# Patient Record
Sex: FEMALE | Race: WHITE | NOT HISPANIC OR LATINO
[De-identification: names, ages, dates, MRNs, and addresses within clinical notes are randomized per-mention and may not be internally consistent; named-entity substitution may affect disease eponyms.]

---

## 2019-10-22 ENCOUNTER — APPOINTMENT (OUTPATIENT)
Dept: ORTHOPEDIC SURGERY | Facility: CLINIC | Age: 79
End: 2019-10-22
Payer: MEDICARE

## 2019-10-22 DIAGNOSIS — S93.409A SPRAIN OF UNSPECIFIED LIGAMENT OF UNSPECIFIED ANKLE, INITIAL ENCOUNTER: ICD-10-CM

## 2019-10-22 PROCEDURE — 73610 X-RAY EXAM OF ANKLE: CPT | Mod: RT

## 2019-10-22 PROCEDURE — 99203 OFFICE O/P NEW LOW 30 MIN: CPT

## 2019-10-22 NOTE — PHYSICAL EXAM
[de-identified] : Right ankle:\par \par Constitutional: \par The patient is healthy-appearing and in no apparent distress. \par \par Gait and Station: \par The patient ambulates with a normal gait and no limp. \par \par Cardiovascular System: \par Ther capillary refill is less than 2 seconds. \par \par Skin: \par There are no skin abnormalities of ankle.\par \par Ankles and Feet: \par Inspection: \par There is no erythema, induration, warmth, or deformity.  \par There is swelling (anterolateral). \par \par Bony Palpation: \par There is no tenderness of the calcaneal tuberosity, the metatarsals, the tarsometatarsal joints, the navicular tuberosity, the dome of talus, the head of talus, or the inferior tibiofibular joint.\par \par Soft Tissue Palpation: \par There is no tenderness of the tibialis posterior, the tibialis anterior, the plantar fascia, the Achilles tendon, the extensor hallucis longus, or the sinus tarsi. \par There is no tenderness of the peroneus longus and brevis.\par There is no tenderness of the deltoid ligament.   \par There is tenderness of the anterior talofibular ligament and the calcaneofibular ligament. \par \par Active Range of Motion: \par The range of motion at the ankle is full. \par \par Stability: \par The anterior drawer is negative. \par \par Strength: \par There is 5/5 ankle plantarflexion and dorsiflexion.\par \par Neurological System: \par There is normal sensation to light touch at the ankle and foot. \par \par Psychiatric: \par The patient demonstrates a normal mood and affect and is active and alert.\par \par  [de-identified] : X-ray right ankle. There is no significant bony abnormality arthritis or fracture\par

## 2019-10-22 NOTE — ASSESSMENT
[FreeTextEntry1] : Discussed at length with patient exam and imaging. Patient like some exercises only this time. Activity injections discussed. If no improvement in 8-10 weeks patient to follow up in office.

## 2019-10-22 NOTE — HISTORY OF PRESENT ILLNESS
[de-identified] : Patient reports that the RIGHT ankle began to bother her 4 days ago after a fall. Dull aching pain. Pain increases with weightbearing and walking. Mild swelling. Some sensations of instability. Pain with twisting/turning the ankle and flexing the foot.

## 2020-02-12 ENCOUNTER — APPOINTMENT (OUTPATIENT)
Dept: ORTHOPEDIC SURGERY | Facility: CLINIC | Age: 80
End: 2020-02-12
Payer: MEDICARE

## 2020-02-12 PROCEDURE — 72100 X-RAY EXAM L-S SPINE 2/3 VWS: CPT

## 2020-02-12 PROCEDURE — 99213 OFFICE O/P EST LOW 20 MIN: CPT

## 2020-02-12 PROCEDURE — 73502 X-RAY EXAM HIP UNI 2-3 VIEWS: CPT | Mod: RT

## 2020-02-13 NOTE — ASSESSMENT
[FreeTextEntry1] : If no improvement with physical therapy and home exercises consideration to MRI of lumbar spine

## 2020-02-13 NOTE — HISTORY OF PRESENT ILLNESS
[de-identified] : Patient reports the RIGHT posterior hip / buttock has been bothering her for 1 month now, atraumatic. Pain in the back of the hip. Aching pain. Pain radiating down leg and into lower back. Pain with going from sit to stand and when sitting down. Pain at night when trying to sleep.

## 2020-02-13 NOTE — PHYSICAL EXAM
[de-identified] : Lumbar back\par \par Constitutional: \par The patient is healthy-appearing and in no apparent distress. \par \par Gait and Station: \par The patient ambulates with a normal gait, no limp. \par \par Cardiovascular System: \par There is bilateral lower extremity capillary refill less than 2 seconds. \par \par Skin: \par There are no skin abnormalities of the lumbar spine.\par \par Lumbar Spine: \par \par Inspection: \par There is no induration, ecchymosis, or swelling. \par \par Bony Palpation: \par There is no tenderness of the spinous processes.\par There is no tenderness of the iliac crest.\par There is no tenderness of either ASIS.\par There is no tenderness of either PSIS\par There is no tenderness of the greater trochanters.\par There is tenderness of the right SI joint.\par There is no tenderness of the left SI joint. \par \par Soft Tissue Palpation:\par There is tenderness of the RIGHT paraspinal region at L4/5. \par  \par Active Range of Motion: \par There is normal lateral flexion and rotation. \par \par Motor Strength: \par There is 5/5 hip flexion, knee extension and flexion, ankle dorsiflexion and plantarflexion.\par \par Neurological System: \par There is normal sensation to light touch on bilateral lower extremities.\par There is a negative supine straight leg raising test.\par There is a negative seated straight leg raising test.  \par There is no clonus. \par \par Psychiatric: \par The patient demonstrates a normal mood and affect and is active and alert. [de-identified] : X-ray right hip.  There is no significant bony / soft tissue abnormality, arthritis, or fracture.\par \par X-ray lumbar spine.  There is diffuse lower lumbar foraminal stenosis with a mild loss of lordosis\par

## 2020-02-27 ENCOUNTER — NON-APPOINTMENT (OUTPATIENT)
Age: 80
End: 2020-02-27

## 2020-02-27 ENCOUNTER — APPOINTMENT (OUTPATIENT)
Dept: OPHTHALMOLOGY | Facility: CLINIC | Age: 80
End: 2020-02-27
Payer: MEDICARE

## 2020-02-27 PROCEDURE — 92002 INTRM OPH EXAM NEW PATIENT: CPT

## 2022-07-07 ENCOUNTER — APPOINTMENT (OUTPATIENT)
Dept: ORTHOPEDIC SURGERY | Facility: CLINIC | Age: 82
End: 2022-07-07

## 2022-07-07 VITALS
WEIGHT: 129 LBS | HEIGHT: 60 IN | DIASTOLIC BLOOD PRESSURE: 70 MMHG | BODY MASS INDEX: 25.32 KG/M2 | SYSTOLIC BLOOD PRESSURE: 132 MMHG | HEART RATE: 65 BPM

## 2022-07-07 DIAGNOSIS — Z86.79 PERSONAL HISTORY OF OTHER DISEASES OF THE CIRCULATORY SYSTEM: ICD-10-CM

## 2022-07-07 DIAGNOSIS — M25.562 PAIN IN LEFT KNEE: ICD-10-CM

## 2022-07-07 DIAGNOSIS — Z98.890 OTHER SPECIFIED POSTPROCEDURAL STATES: ICD-10-CM

## 2022-07-07 DIAGNOSIS — S83.412A SPRAIN OF MEDIAL COLLATERAL LIGAMENT OF LEFT KNEE, INITIAL ENCOUNTER: ICD-10-CM

## 2022-07-07 DIAGNOSIS — Z86.39 PERSONAL HISTORY OF OTHER ENDOCRINE, NUTRITIONAL AND METABOLIC DISEASE: ICD-10-CM

## 2022-07-07 DIAGNOSIS — Z80.9 FAMILY HISTORY OF MALIGNANT NEOPLASM, UNSPECIFIED: ICD-10-CM

## 2022-07-07 DIAGNOSIS — Z87.39 PERSONAL HISTORY OF OTHER DISEASES OF THE MUSCULOSKELETAL SYSTEM AND CONNECTIVE TISSUE: ICD-10-CM

## 2022-07-07 DIAGNOSIS — Z60.2 PROBLEMS RELATED TO LIVING ALONE: ICD-10-CM

## 2022-07-07 DIAGNOSIS — Z78.9 OTHER SPECIFIED HEALTH STATUS: ICD-10-CM

## 2022-07-07 PROCEDURE — 73564 X-RAY EXAM KNEE 4 OR MORE: CPT | Mod: LT,RT

## 2022-07-07 PROCEDURE — 99203 OFFICE O/P NEW LOW 30 MIN: CPT

## 2022-07-07 SDOH — SOCIAL STABILITY - SOCIAL INSECURITY: PROBLEMS RELATED TO LIVING ALONE: Z60.2

## 2022-07-07 NOTE — PHYSICAL EXAM
[de-identified] : Left knee= ROM 0-135, no effusion Quad 4/5, medial joint tenderness, positive grade 2 valgus stress test, Grade 2 Lachman negative pivot shift.  [de-identified] : 4 views of bilateral knees show no fractures or dislocations. KL 2 and mild medial joint narrowing.

## 2022-07-07 NOTE — HISTORY OF PRESENT ILLNESS
[de-identified] : 81 y/o female presents today for initial visit for left knee pain. Patient states she was at her daughters house a fell in the bathroom in early June. She describes her pain on the inside of her knee as intermittent and sharp with rest making it better and putting weight on it making it worse. She had a left knee ACL repair about 30 yrs ago.Takes Advil as needed for pain.

## 2022-07-07 NOTE — DISCUSSION/SUMMARY
[de-identified] : 83 yo woman with left knee pain after falling in bathtub in June. Her exam reveals signs of an MCL sprain superimposed on some rsidual Chronic ACL laxity s/p ACL reconstruction 30 yrs ago. I recommend and prescribed a player brace to help with her MCL sprain, but she would also benefit from PT at this time.\par \par Plan\par Player brace\par PT\par Advil and ice PRN\par F/u 4 wks- if still experiencing pain may consider MRI to look at medial meniscus but I am hopeful that the helaing of the MCL will adress the issue adequately

## 2022-08-04 ENCOUNTER — APPOINTMENT (OUTPATIENT)
Dept: ORTHOPEDIC SURGERY | Facility: CLINIC | Age: 82
End: 2022-08-04

## 2022-11-11 ENCOUNTER — APPOINTMENT (OUTPATIENT)
Dept: OPHTHALMOLOGY | Facility: CLINIC | Age: 82
End: 2022-11-11

## 2023-04-28 ENCOUNTER — APPOINTMENT (OUTPATIENT)
Dept: ORTHOPEDIC SURGERY | Facility: CLINIC | Age: 83
End: 2023-04-28
Payer: MEDICARE

## 2023-04-28 VITALS — WEIGHT: 130 LBS | HEIGHT: 60 IN | BODY MASS INDEX: 25.52 KG/M2

## 2023-04-28 PROCEDURE — 99204 OFFICE O/P NEW MOD 45 MIN: CPT | Mod: 25

## 2023-04-28 PROCEDURE — 72040 X-RAY EXAM NECK SPINE 2-3 VW: CPT

## 2023-04-28 PROCEDURE — 99214 OFFICE O/P EST MOD 30 MIN: CPT | Mod: 25

## 2023-04-28 PROCEDURE — 20553 NJX 1/MLT TRIGGER POINTS 3/>: CPT

## 2023-04-28 NOTE — HISTORY OF PRESENT ILLNESS
[de-identified] : This is a 84yo Female that present for back pain. She states this has occurred intermitted for several years and was controlled by a"steroid injection" her PCP gave her "many years ago". She said that helped very well with the pain. The pain is worsen by neck movement. She denies injuries/trauma. 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 Denies numbness and tingling of neck or arms, or hands, or any where else. Advil and heating pad with some relief but does not help this time. PT about a year ago with some relief. She denies radicular pain, recent illness, fevers, numbness, weakness, balance problems, saddle anesthesia, urinary retention or fecal incontinence.

## 2023-04-28 NOTE — ASSESSMENT
[FreeTextEntry1] : This is a 84yo Female that present for back pain. She states this has occurred intermitted for several years and was controlled by a"steroid injection" her PCP gave her "many years ago". She said that helped very well with the pain. The pain is worsen by neck movement. She denies injuries/trauma. 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 Denies numbness and tingling of neck or arms, or hands, or any where else. Advil and heating pad with some relief but does not help this time. PT about a year ago with some relief. She is otherwise neurologically intact. We reviewed her Xrays and discussed treatment options including medications, PT, and injections. She was given a referral for PT, steroid injections in office and a prescription for diclofenac. RTC in 4 weeks or PRN. We discussed red flag symptoms that would require emergent evaluation. She knows to call with any questions or concerns or if her symptoms acutely worsen.

## 2023-04-28 NOTE — PHYSICAL EXAM
[de-identified] : General: No acute distress, conversant, well-nourished.\par Head: Normocephalic, atraumatic\par Neck: trachea midline, FROM\par Heart: normotensive and normal rate and rhythm\par Lungs: No labored breathing\par Skin: No abrasions, no rashes, no edema\par Psych: Alert and oriented to person, place and time\par Extremities: no peripheral edema or digital cyanosis\par Gait: Normal gait. Can perform tandem gait.  \par Vascular: warm and well perfused distally, palpable distal pulses\par \par MSK:\par Cervical Spine: \par Alignment normal. \par No tenderness to palpation.  No step-off, no deformity.\par No pain or neurologic symptoms with full active range of motion (flexion, extension, lateral bending and rotation).\par \par NEURO:\par Sensation \par          Left           \par C5     2/2               \par C6     2/2               \par C7     2/2               \par C8     2/2              \par T1     2/2             \par \par          Right         \par C5     2/2               \par C6     2/2               \par C7     2/2               \par C8     2/2              \par T1     2/2      \par \par Motor: \par                                                Left             \par C5 (deltoid abduction)             5/5               \par C6 (biceps flexion)                   5/5                \par C7 (triceps extension)             5/5               \par C8 (finger flexion)                     5/5               \par T1 (interosseous)                     5/5           \par \par                                                Right           \par C5 (deltoid abduction)             5/5               \par C6 (biceps flexion)                   5/5                \par C7 (triceps extension)             5/5               \par C8 (finger flexion)                     5/5               \par T1 (interosseous)                     5/5                     \par \par Reflexes: Normal and symmetric\par \par Negative Spurling’s test.  Negative Bakody’s sign. Negative Leung’s reflex.  \par   [de-identified] : I ordered radiographs to evaluate the patient's symptoms. \par \par C Spine : 4 View - No fx, no instability on dynamic images, moderate arthritic changes with slight  disc heights.

## 2023-04-28 NOTE — PROCEDURE
[de-identified] : Procedure: Trigger Point Injections with corticosteroid (4 muscles)\par Pre-Procedure Diagnosis: Cervical pain\par Post-Procedure Diagnosis: same\par \par The patient was educated about the risks and benefits of a corticosteroid injection.  Alternatives were discussed.  The patient understood and consented for the procedure.\par \par The area was sterilely prepped using isopropyl alcohol.  An ethyl chloride spray provided  local anesthesia.  Using the usual sterile technique, 1 ml of 40mg/1ml of Kenalog, 2 ml of bupivacaine and 2 ml of Lidocaine 1% without epinephrine was injected into the trigger points. Dressings were applied to the areas.  The patient tolerated the procedure well and without complication.

## 2023-06-14 ENCOUNTER — APPOINTMENT (OUTPATIENT)
Dept: ORTHOPEDIC SURGERY | Facility: CLINIC | Age: 83
End: 2023-06-14
Payer: MEDICARE

## 2023-06-14 PROCEDURE — 99214 OFFICE O/P EST MOD 30 MIN: CPT

## 2023-06-26 NOTE — REASON FOR VISIT
[Follow-Up Visit] : a follow-up visit for [Back Pain] : back pain [Neck Pain] : neck pain [FreeTextEntry2] : PT minimal use

## 2023-06-26 NOTE — ASSESSMENT
[FreeTextEntry1] : 83 year old female followup for acute exacerbation of chronic neck and low back pain. She denies radicular pain. She is neurologically intact. She has been doing PT without improvement.  She will be sent for a cervical MRI.  She can continue PT. She can continue diclofenac. She will followup after her MRI. We discussed red flag symptoms that would require emergent evaluation. She knows to call with any questions or concerns or if her symptoms acutely worsen.

## 2023-06-26 NOTE — PHYSICAL EXAM
[de-identified] : General: No acute distress, conversant, well-nourished.\par Head: Normocephalic, atraumatic\par Neck: trachea midline, FROM\par Heart: normotensive and normal rate and rhythm\par Lungs: No labored breathing\par Skin: No abrasions, no rashes, no edema\par Psych: Alert and oriented to person, place and time\par Extremities: no peripheral edema or digital cyanosis\par Gait: Normal gait. Can perform tandem gait.  \par Vascular: warm and well perfused distally, palpable distal pulses\par \par MSK:\par Cervical Spine: \par Alignment normal. \par No tenderness to palpation.  No step-off, no deformity.\par No pain or neurologic symptoms with full active range of motion (flexion, extension, lateral bending and rotation).\par \par NEURO:\par Sensation \par          Left           \par C5     2/2               \par C6     2/2               \par C7     2/2               \par C8     2/2              \par T1     2/2             \par \par          Right         \par C5     2/2               \par C6     2/2               \par C7     2/2               \par C8     2/2              \par T1     2/2      \par \par Motor: \par                                                Left             \par C5 (deltoid abduction)             5/5               \par C6 (biceps flexion)                   5/5                \par C7 (triceps extension)             5/5               \par C8 (finger flexion)                     5/5               \par T1 (interosseous)                     5/5           \par \par                                                Right           \par C5 (deltoid abduction)             5/5               \par C6 (biceps flexion)                   5/5                \par C7 (triceps extension)             5/5               \par C8 (finger flexion)                     5/5               \par T1 (interosseous)                     5/5                     \par \par Reflexes: Normal and symmetric\par \par Negative Spurling’s test.  Negative Bakody’s sign. Negative Leung’s reflex.  \par   [de-identified] : Cervical 4 view radiographs (4/28/23) no dislocation or fracture. Cervical spondylosis.  No instability on dynamic series.

## 2023-06-26 NOTE — HISTORY OF PRESENT ILLNESS
[de-identified] : 83 year old female followup for acute exacerbation of chronic neck and low back pain. She denies radicular pain, recent illness, fevers, numbness, weakness, balance problems, saddle anesthesia, urinary retention or fecal incontinence. She has been doing PT without improvement.

## 2023-07-20 ENCOUNTER — APPOINTMENT (OUTPATIENT)
Dept: ORTHOPEDIC SURGERY | Facility: CLINIC | Age: 83
End: 2023-07-20
Payer: MEDICARE

## 2023-07-20 DIAGNOSIS — M54.2 CERVICALGIA: ICD-10-CM

## 2023-07-20 DIAGNOSIS — M54.50 LOW BACK PAIN, UNSPECIFIED: ICD-10-CM

## 2023-07-20 PROCEDURE — 99214 OFFICE O/P EST MOD 30 MIN: CPT

## 2023-07-26 ENCOUNTER — APPOINTMENT (OUTPATIENT)
Dept: HEART AND VASCULAR | Facility: CLINIC | Age: 83
End: 2023-07-26
Payer: MEDICARE

## 2023-07-26 ENCOUNTER — NON-APPOINTMENT (OUTPATIENT)
Age: 83
End: 2023-07-26

## 2023-07-26 VITALS
OXYGEN SATURATION: 98 % | WEIGHT: 131 LBS | BODY MASS INDEX: 25.72 KG/M2 | HEIGHT: 60 IN | HEART RATE: 73 BPM | DIASTOLIC BLOOD PRESSURE: 78 MMHG | SYSTOLIC BLOOD PRESSURE: 127 MMHG | TEMPERATURE: 95.7 F

## 2023-07-26 DIAGNOSIS — Z78.9 OTHER SPECIFIED HEALTH STATUS: ICD-10-CM

## 2023-07-26 DIAGNOSIS — Z82.3 FAMILY HISTORY OF STROKE: ICD-10-CM

## 2023-07-26 DIAGNOSIS — I45.10 UNSPECIFIED RIGHT BUNDLE-BRANCH BLOCK: ICD-10-CM

## 2023-07-26 PROCEDURE — 93000 ELECTROCARDIOGRAM COMPLETE: CPT

## 2023-07-26 PROCEDURE — 99203 OFFICE O/P NEW LOW 30 MIN: CPT

## 2023-07-26 RX ORDER — AMLODIPINE BESYLATE 10 MG/1
10 TABLET ORAL
Refills: 0 | Status: ACTIVE | COMMUNITY
Start: 2023-07-26

## 2023-07-26 RX ORDER — CITALOPRAM 20 MG/1
20 TABLET, FILM COATED ORAL
Refills: 0 | Status: ACTIVE | COMMUNITY
Start: 2023-07-26

## 2023-07-26 RX ORDER — MELOXICAM 15 MG/1
15 TABLET ORAL
Qty: 30 | Refills: 0 | Status: DISCONTINUED | COMMUNITY
Start: 2023-07-20 | End: 2023-07-26

## 2023-07-26 RX ORDER — ATORVASTATIN CALCIUM 20 MG/1
20 TABLET, FILM COATED ORAL
Refills: 0 | Status: ACTIVE | COMMUNITY
Start: 2023-07-26

## 2023-07-26 RX ORDER — OMEPRAZOLE MAGNESIUM 10 MG/1
10 GRANULE, DELAYED RELEASE ORAL
Refills: 0 | Status: ACTIVE | COMMUNITY
Start: 2023-07-26

## 2023-07-26 RX ORDER — ASPIRIN 81 MG/1
81 TABLET, CHEWABLE ORAL
Refills: 0 | Status: ACTIVE | COMMUNITY
Start: 2023-07-26

## 2023-07-26 RX ORDER — DICLOFENAC SODIUM 75 MG/1
75 TABLET, DELAYED RELEASE ORAL
Qty: 120 | Refills: 0 | Status: DISCONTINUED | COMMUNITY
Start: 2023-04-28 | End: 2023-07-26

## 2023-07-28 PROBLEM — I45.10 RBBB: Status: ACTIVE | Noted: 2023-07-28

## 2023-07-28 NOTE — PHYSICAL EXAM
[Well Developed] : well developed [Well Nourished] : well nourished [No Acute Distress] : no acute distress [Normal Conjunctiva] : normal conjunctiva [Normal Rate] : normal [Rhythm Regular] : regular [Normal S1] : normal S1 [Normal S2] : normal S2 [Clear Lung Fields] : clear lung fields [Good Air Entry] : good air entry [No Respiratory Distress] : no respiratory distress  [Normal Gait] : normal gait [No Edema] : no edema [No Rash] : no rash [Moves all extremities] : moves all extremities [Alert and Oriented] : alert and oriented

## 2023-08-03 NOTE — REVIEW OF SYSTEMS
[Dyspnea on exertion] : dyspnea during exertion [Negative] : Psychiatric [Fever] : no fever [Chills] : no chills [Feeling Fatigued] : not feeling fatigued [SOB] : no shortness of breath [Chest Discomfort] : no chest discomfort [Palpitations] : no palpitations [Orthopnea] : no orthopnea [Syncope] : no syncope [Cough] : no cough [Wheezing] : no wheezing

## 2023-08-03 NOTE — HISTORY OF PRESENT ILLNESS
[FreeTextEntry1] : 83 year old female with HTN and HLD with presents for initial evaluation.\par \par She states she walks 2 miles a day without issues.  Recently she was having shallow breaths when walking and saw cardiology.  She states she saw Dr. Casanova from [facility] cardiology.  She states she had a stress test, echo and wore a week event monitor.  Unfortunately we did not get any records and when his office was called they stated he is on vacation and the reports will not be finalized / can not be sent until 8/3.  As per the patient she was told everything was normal but there were ?abnormal spikes" on her EKG which is why she was sent here.  The office did send her EKG which showed a RBBB but no other abnormalities.  SHe denies any history of arrhythmias.  No palpitations, chest pain, syncope, near syncope or orthopnea.  SHe has some lightheadedness when she stands up fast.\par \par

## 2023-08-03 NOTE — DISCUSSION/SUMMARY
[EKG obtained to assist in diagnosis and management of assessed problem(s)] : EKG obtained to assist in diagnosis and management of assessed problem(s) [FreeTextEntry1] : 83 year old female with HTN and HLD with presents for initial evaluation.  Unfortunately, we can not get the echo or event monitor Ms. Bejarano wore until after 8.3 as per her cardiologist office.  EKG faxed which showed a RBBB which is on her EKG today.  No other abnormalities noted.  We discussed the normal conduction system of the heart and what a RBBB is.  No syncope or near syncope.  She has follow up with her cardiologist and will assure her echo and event monitor are faxed to us for review and we will call her if any further intervention or work up is warranted.  SHe knows to call with any questions or concerns.

## 2023-08-03 NOTE — ADDENDUM
[FreeTextEntry1] : I, Javier Field, am scribing for and the presence of Dr. Baltazar the following sections: HPI, PMH,Family/social history, ROS, Physical Exam, Assessment / Plan.  I, Darryl Baltazar, personally performed the services described in the documentation, reviewed the documentation recorded by the scribe in my presence and it accurately and completely records my words and actions.

## 2023-08-25 NOTE — PHYSICAL EXAM
[de-identified] : General: No acute distress, conversant, well-nourished.\par  Head: Normocephalic, atraumatic\par  Neck: trachea midline, FROM\par  Heart: normotensive and normal rate and rhythm\par  Lungs: No labored breathing\par  Skin: No abrasions, no rashes, no edema\par  Psych: Alert and oriented to person, place and time\par  Extremities: no peripheral edema or digital cyanosis\par  Gait: Normal gait. Can perform tandem gait.  \par  Vascular: warm and well perfused distally, palpable distal pulses\par  \par  MSK:\par  Cervical Spine: \par  Alignment normal. \par  No tenderness to palpation.  No step-off, no deformity.\par  No pain or neurologic symptoms with full active range of motion (flexion, extension, lateral bending and rotation).\par  \par  NEURO:\par  Sensation \par           Left           \par  C5     2/2               \par  C6     2/2               \par  C7     2/2               \par  C8     2/2              \par  T1     2/2             \par  \par           Right         \par  C5     2/2               \par  C6     2/2               \par  C7     2/2               \par  C8     2/2              \par  T1     2/2      \par  \par  Motor: \par                                                 Left             \par  C5 (deltoid abduction)             5/5               \par  C6 (biceps flexion)                   5/5                \par  C7 (triceps extension)             5/5               \par  C8 (finger flexion)                     5/5               \par  T1 (interosseous)                     5/5           \par  \par                                                 Right           \par  C5 (deltoid abduction)             5/5               \par  C6 (biceps flexion)                   5/5                \par  C7 (triceps extension)             5/5               \par  C8 (finger flexion)                     5/5               \par  T1 (interosseous)                     5/5                     \par  \par  Reflexes: Normal and symmetric\par  \par  Negative Spurling's test.  Negative Bakody's sign. Negative Leung's reflex.  \par    [de-identified] :  MRI of the cervical spine demonstrates:(07/12/23)  Multilevel degenerative changes of the cervical spine contributing to mild spinal canal stenosis at C3-4, C4-5, C5-6, and C6-7, and mild ventral thecal sac effacement at C7-T1.  Multilevel foraminal stenoses, worst on the right at C3-4 and C5-6.  Moderate to severe multilevel facet arthrosis.   Cervical 4 view radiographs (4/28/23) no dislocation or fracture. Cervical spondylosis.  No instability on dynamic series.

## 2023-08-25 NOTE — HISTORY OF PRESENT ILLNESS
[de-identified] : 83 year old female followup for acute exacerbation of chronic neck and low back pain. She denies radicular pain, recent illness, fevers, numbness, weakness, balance problems, saddle anesthesia, urinary retention or fecal incontinence. Her symptoms have continued. She is here to review her MRI.

## 2023-10-19 ENCOUNTER — APPOINTMENT (OUTPATIENT)
Dept: OPHTHALMOLOGY | Facility: CLINIC | Age: 83
End: 2023-10-19

## 2024-11-06 NOTE — ASSESSMENT
Fax received from [facility] requesting [facility]/medical records. Fax forwarded to [facility] to address further. Confirmation fax report received.    [FreeTextEntry1] : 83 year old female followup for acute exacerbation of chronic neck and low back pain. She denies radicular pain. She is neuro intact. Her symptoms have continued. We reviewed her MRI. We discussed treatment options including physical therapy, medications, and spinal injections. The patient was given a referral for physical therapy.  The patient was given a referral for consideration for spinal injections.  Followup in 6-8 weeks. We discussed red flag symptoms that would require emergent evaluation. She knows to call with any questions or concerns or if her symptoms acutely worsen.